# Patient Record
Sex: FEMALE | Race: WHITE
[De-identification: names, ages, dates, MRNs, and addresses within clinical notes are randomized per-mention and may not be internally consistent; named-entity substitution may affect disease eponyms.]

---

## 2019-10-09 ENCOUNTER — HOSPITAL ENCOUNTER (OUTPATIENT)
Dept: HOSPITAL 25 - OREAST | Age: 74
Discharge: HOME | End: 2019-10-09
Attending: SPECIALIST
Payer: MEDICARE

## 2019-10-09 VITALS — DIASTOLIC BLOOD PRESSURE: 58 MMHG | SYSTOLIC BLOOD PRESSURE: 104 MMHG

## 2019-10-09 DIAGNOSIS — H25.812: Primary | ICD-10-CM

## 2019-10-09 DIAGNOSIS — E11.3293: ICD-10-CM

## 2019-10-09 DIAGNOSIS — Z87.891: ICD-10-CM

## 2019-10-09 DIAGNOSIS — E78.5: ICD-10-CM

## 2019-10-09 DIAGNOSIS — M85.80: ICD-10-CM

## 2019-10-09 DIAGNOSIS — J44.9: ICD-10-CM

## 2019-10-09 DIAGNOSIS — Z79.84: ICD-10-CM

## 2019-10-09 DIAGNOSIS — I34.8: ICD-10-CM

## 2019-10-09 DIAGNOSIS — H40.1122: ICD-10-CM

## 2019-10-09 PROCEDURE — V2632 POST CHMBR INTRAOCULAR LENS: HCPCS

## 2019-10-09 PROCEDURE — C1783 OCULAR IMP, AQUEOUS DRAIN DE: HCPCS

## 2019-10-09 NOTE — OP
DATE OF OPERATION:  10/09/2019 - [facility]

 

YOB: 1945.

 

SURGEON:  Juan Mason M.D.

 

PREOPERATIVE DIAGNOSIS:  Cataract and glaucoma left eye.

 

POSTOPERATIVE DIAGNOSIS:  Cataract and glaucoma left eye.

 

OPERATIVE PROCEDURE:  Extracapsular cataract extraction with intraocular lens 
implant and iStent left eye.

 

DESCRIPTION OF PROCEDURE:  The patient was brought to the operating room after 
being given 1/2% Alcaine with epinephrine drops in the preoperative area.  The 
eye was prepped and draped in the usual sterile fashion.  Sterile drape and 
eyelid speculum were placed.  Again, topical 1/2% Alcaine with epinephrine was 
given.  A paracentesis incision was made at the 3 o'clock position with the 
No.75 blade.  Clear cornea incision 2.2 x 2.2-mm was created at the 6 o'clock 
position starting at the anterior limbus using the 2.2-mm keratome.  The 
anterior chamber was irrigated with 0.4 mL of 1% non-preservative intracameral 
lidocaine and filled with DisCoVisc.  A capsulorrhexis was completed using the 
cystotome and the Utrata forceps. Hydrodissection was performed with balanced 
salt solution.  The lens nucleus was removed with the Phacoemulsification 
handpiece without incident.  Cortex was removed with the irrigation-aspiration 
handpiece.  The capsular bag was re-inflated using DisCoVisc and an SN60WF 18.5 
implant was inserted with the shooter, followed by an iStent inject inserted 
with its shooter at the 8 o'clock and 10 o'clock position.  The irrigation-
aspiration handpiece was used to remove all residual DisCoVisc.  The eye was 
refilled with balanced salt solution and the wound checked and found to be 
watertight.  Topical Maxitrol drops were given.

 

 [id number]/[facility] #: [id number]

[facility] CenterMARYUL

## 2019-10-16 ENCOUNTER — HOSPITAL ENCOUNTER (OUTPATIENT)
Dept: HOSPITAL 25 - OREAST | Age: 74
Discharge: HOME | End: 2019-10-16
Attending: SPECIALIST
Payer: MEDICARE

## 2019-10-16 VITALS — DIASTOLIC BLOOD PRESSURE: 69 MMHG | SYSTOLIC BLOOD PRESSURE: 105 MMHG

## 2019-10-16 DIAGNOSIS — H40.1111: ICD-10-CM

## 2019-10-16 DIAGNOSIS — E78.2: ICD-10-CM

## 2019-10-16 DIAGNOSIS — R94.31: ICD-10-CM

## 2019-10-16 DIAGNOSIS — H25.811: Primary | ICD-10-CM

## 2019-10-16 DIAGNOSIS — I10: ICD-10-CM

## 2019-10-16 DIAGNOSIS — J44.9: ICD-10-CM

## 2019-10-16 DIAGNOSIS — E11.3293: ICD-10-CM

## 2019-10-16 DIAGNOSIS — Z79.84: ICD-10-CM

## 2019-10-16 DIAGNOSIS — M81.0: ICD-10-CM

## 2019-10-16 DIAGNOSIS — Z87.891: ICD-10-CM

## 2019-10-16 PROCEDURE — C1783 OCULAR IMP, AQUEOUS DRAIN DE: HCPCS

## 2019-10-16 PROCEDURE — V2632 POST CHMBR INTRAOCULAR LENS: HCPCS

## 2019-10-16 NOTE — OP
DATE OF OPERATION:  10/16/19 [facility]

 

DATE OF BIRTH:  [birth year]

 

SURGEON:  Juan Mason M.D.

 

PREOPERATIVE DIAGNOSIS:  Cataract and glaucoma, right eye.

 

POSTOPERATIVE DIAGNOSIS:  Cataract and glaucoma, right eye.

 

OPERATIVE PROCEDURE:  Extracapsular cataract extraction with intraocular lens 
implant, right eye.

 

DESCRIPTION OF PROCEDURE:  The patient was brought to the operating room after 
being given 1/2% Alcaine with epinephrine drops in the preoperative area.  The 
eye was prepped and draped in the usual sterile fashion.  Sterile drape and 
eyelid speculum were placed.  Again, topical 1/2% Alcaine with epinephrine was 
given.  A paracentesis incision was made at the 9 o'clock position with the 
No.75 blade.  Clear cornea incision 2.2 x 2.2-mm was created at the 12 o'clock 
position starting at the anterior limbus using the 2.2-mm keratome.  The 
anterior chamber was irrigated with 0.4 mL of 1% non-preservative intracameral 
lidocaine and filled with DisCoVisc.  A capsulorrhexis was completed using the 
cystotome and the Utrata forceps. Hydrodissection was performed with balanced 
salt solution.  The lens nucleus was removed with the Phacoemulsification 
handpiece without incident.  Cortex was removed with the irrigation-aspiration 
handpiece.  The capsular bag was re-inflated using DisCoVisc and an [id number] 17 
implant was inserted with the shooter followed by the iStent inject inserted at 
the 2 o'clock and 4 o'clock position with the shooter.  The irrigation-
aspiration handpiece was used to remove all residual DisCoVisc.  The eye was 
refilled with balanced salt solution and the wound checked and found to be 
watertight.  Topical Maxitrol drops were given.

 

 [id number]/[facility] #: [id number]

MTDD